# Patient Record
Sex: FEMALE | Race: OTHER
[De-identification: names, ages, dates, MRNs, and addresses within clinical notes are randomized per-mention and may not be internally consistent; named-entity substitution may affect disease eponyms.]

---

## 2020-04-14 ENCOUNTER — HOSPITAL ENCOUNTER (EMERGENCY)
Dept: HOSPITAL 8 - ED | Age: 30
Discharge: HOME | End: 2020-04-14
Payer: COMMERCIAL

## 2020-04-14 VITALS — DIASTOLIC BLOOD PRESSURE: 73 MMHG | SYSTOLIC BLOOD PRESSURE: 115 MMHG

## 2020-04-14 VITALS — BODY MASS INDEX: 26.41 KG/M2 | WEIGHT: 143.52 LBS | HEIGHT: 62 IN

## 2020-04-14 DIAGNOSIS — N30.01: Primary | ICD-10-CM

## 2020-04-14 DIAGNOSIS — R50.9: ICD-10-CM

## 2020-04-14 LAB
CULTURE INDICATED?: YES
HCG UR SG: 1 (ref 1–1.03)
MICROSCOPIC: (no result)

## 2020-04-14 PROCEDURE — 87077 CULTURE AEROBIC IDENTIFY: CPT

## 2020-04-14 PROCEDURE — 87086 URINE CULTURE/COLONY COUNT: CPT

## 2020-04-14 PROCEDURE — 87186 SC STD MICRODIL/AGAR DIL: CPT

## 2020-04-14 PROCEDURE — 99283 EMERGENCY DEPT VISIT LOW MDM: CPT

## 2020-04-14 PROCEDURE — 81001 URINALYSIS AUTO W/SCOPE: CPT

## 2020-04-14 PROCEDURE — 81025 URINE PREGNANCY TEST: CPT

## 2020-04-14 NOTE — NUR
Pt presents to ed c/o hematuria and painful urinationx1 day. States similar s/s 
in past and treated for uti during that time. Denies abd pain or flank pain. 
States subjective fever at home, but is afebrile at this time. Pt is mildly 
tachycardic. Pa aware. States only hx of sz well controlled w/ medicine. 
Attempting ua sample at this time. Awaiting further orders.